# Patient Record
Sex: MALE | URBAN - METROPOLITAN AREA
[De-identification: names, ages, dates, MRNs, and addresses within clinical notes are randomized per-mention and may not be internally consistent; named-entity substitution may affect disease eponyms.]

---

## 2022-04-28 ENCOUNTER — APPOINTMENT (RX ONLY)
Dept: URBAN - METROPOLITAN AREA CLINIC 46 | Facility: CLINIC | Age: 42
Setting detail: DERMATOLOGY
End: 2022-04-28

## 2022-04-28 DIAGNOSIS — L20.89 OTHER ATOPIC DERMATITIS: ICD-10-CM

## 2022-04-28 DIAGNOSIS — L24 IRRITANT CONTACT DERMATITIS: ICD-10-CM

## 2022-04-28 DIAGNOSIS — L85.3 XEROSIS CUTIS: ICD-10-CM | Status: INADEQUATELY CONTROLLED

## 2022-04-28 PROBLEM — L30.9 DERMATITIS, UNSPECIFIED: Status: ACTIVE | Noted: 2022-04-28

## 2022-04-28 PROCEDURE — 99204 OFFICE O/P NEW MOD 45 MIN: CPT

## 2022-04-28 PROCEDURE — ? DEFER

## 2022-04-28 PROCEDURE — ? PRESCRIPTION MEDICATION MANAGEMENT

## 2022-04-28 PROCEDURE — ? COUNSELING

## 2022-04-28 ASSESSMENT — LOCATION ZONE DERM: LOCATION ZONE: HAND

## 2022-04-28 ASSESSMENT — LOCATION DETAILED DESCRIPTION DERM: LOCATION DETAILED: RIGHT RADIAL PALM

## 2022-04-28 ASSESSMENT — LOCATION SIMPLE DESCRIPTION DERM: LOCATION SIMPLE: RIGHT HAND

## 2022-04-28 NOTE — PROCEDURE: DEFER
Detail Level: Zone
Introduction Text (Please End With A Colon): A.) right palm , 3mm , 4.0 closure ;
Procedure To Be Performed At Next Visit: Biopsy by punch method
Introduction Text (Please End With A Colon): B) left medial knee , 3mm , 4.0 closure ;

## 2022-04-28 NOTE — PROCEDURE: MIPS QUALITY
Quality 110: Preventive Care And Screening: Influenza Immunization: Influenza immunization was not ordered or administered, reason not given
Quality 431: Preventive Care And Screening: Unhealthy Alcohol Use - Screening: Patient not identified as an unhealthy alcohol user when screened for unhealthy alcohol use using a systematic screening method
Quality 226: Preventive Care And Screening: Tobacco Use: Screening And Cessation Intervention: Tobacco Screening not Performed for Medical Reasons
Detail Level: Detailed

## 2022-04-28 NOTE — PROCEDURE: PRESCRIPTION MEDICATION MANAGEMENT
Render In Strict Bullet Format?: Yes
Detail Level: Generalized
Plan: Patient educated, recommended to use moisturizer and sunscreen daily.

## 2022-05-25 ENCOUNTER — APPOINTMENT (RX ONLY)
Dept: URBAN - METROPOLITAN AREA CLINIC 46 | Facility: CLINIC | Age: 42
Setting detail: DERMATOLOGY
End: 2022-05-25

## 2022-05-25 DIAGNOSIS — B35.4 TINEA CORPORIS: ICD-10-CM

## 2022-05-25 DIAGNOSIS — L20.89 OTHER ATOPIC DERMATITIS: ICD-10-CM

## 2022-05-25 DIAGNOSIS — D22 MELANOCYTIC NEVI: ICD-10-CM

## 2022-05-25 DIAGNOSIS — D485 NEOPLASM OF UNCERTAIN BEHAVIOR OF SKIN: ICD-10-CM

## 2022-05-25 DIAGNOSIS — L81.4 OTHER MELANIN HYPERPIGMENTATION: ICD-10-CM

## 2022-05-25 PROBLEM — L20.84 INTRINSIC (ALLERGIC) ECZEMA: Status: ACTIVE | Noted: 2022-05-25

## 2022-05-25 PROBLEM — D48.5 NEOPLASM OF UNCERTAIN BEHAVIOR OF SKIN: Status: ACTIVE | Noted: 2022-05-25

## 2022-05-25 PROBLEM — D22.62 MELANOCYTIC NEVI OF LEFT UPPER LIMB, INCLUDING SHOULDER: Status: ACTIVE | Noted: 2022-05-25

## 2022-05-25 PROCEDURE — 99213 OFFICE O/P EST LOW 20 MIN: CPT | Mod: 25

## 2022-05-25 PROCEDURE — ? BIOPSY BY PUNCH METHOD

## 2022-05-25 PROCEDURE — ? COUNSELING

## 2022-05-25 PROCEDURE — 11104 PUNCH BX SKIN SINGLE LESION: CPT

## 2022-05-25 PROCEDURE — ? SUNSCREEN TREATMENT REGIMEN

## 2022-05-25 PROCEDURE — 11105 PUNCH BX SKIN EA SEP/ADDL: CPT

## 2022-05-25 PROCEDURE — ? PRESCRIPTION

## 2022-05-25 PROCEDURE — ? DEFER

## 2022-05-25 RX ORDER — CLOBETASOL PROPIONATE 0.5 MG/G
CREAM TOPICAL
Qty: 60 | Refills: 0 | Status: CANCELLED | COMMUNITY
Start: 2022-05-25

## 2022-05-25 RX ORDER — KETOCONAZOLE 20 MG/G
CREAM TOPICAL BID
Qty: 60 | Refills: 0 | Status: CANCELLED | COMMUNITY
Start: 2022-05-25

## 2022-05-25 RX ORDER — KETOCONAZOLE 20 MG/G
CREAM TOPICAL BID
Qty: 60 | Refills: 0 | Status: ERX

## 2022-05-25 RX ORDER — DESONIDE 0.5 MG/G
CREAM TOPICAL
Qty: 60 | Refills: 0 | Status: CANCELLED | COMMUNITY
Start: 2022-05-25

## 2022-05-25 RX ORDER — DESONIDE 0.5 MG/G
CREAM TOPICAL
Qty: 60 | Refills: 0 | Status: ERX

## 2022-05-25 RX ORDER — CLOBETASOL PROPIONATE 0.5 MG/G
CREAM TOPICAL
Qty: 60 | Refills: 0 | Status: ERX

## 2022-05-25 RX ADMIN — KETOCONAZOLE: 20 CREAM TOPICAL at 00:00

## 2022-05-25 RX ADMIN — CLOBETASOL PROPIONATE: 0.5 CREAM TOPICAL at 00:00

## 2022-05-25 RX ADMIN — DESONIDE: 0.5 CREAM TOPICAL at 00:00

## 2022-05-25 ASSESSMENT — LOCATION DETAILED DESCRIPTION DERM
LOCATION DETAILED: RIGHT BUTTOCK
LOCATION DETAILED: LEFT SUPERIOR FOREHEAD
LOCATION DETAILED: LEFT DISTAL POSTERIOR UPPER ARM
LOCATION DETAILED: LEFT BUTTOCK
LOCATION DETAILED: RIGHT DISTAL POSTERIOR UPPER ARM
LOCATION DETAILED: RIGHT CENTRAL MALAR CHEEK
LOCATION DETAILED: LEFT PROXIMAL DORSAL FOREARM
LOCATION DETAILED: LEFT KNEE
LOCATION DETAILED: INFERIOR LUMBAR SPINE
LOCATION DETAILED: RIGHT RADIAL PALM
LOCATION DETAILED: LEFT CENTRAL MALAR CHEEK

## 2022-05-25 ASSESSMENT — LOCATION ZONE DERM
LOCATION ZONE: FACE
LOCATION ZONE: HAND
LOCATION ZONE: ARM
LOCATION ZONE: LEG
LOCATION ZONE: TRUNK

## 2022-05-25 ASSESSMENT — LOCATION SIMPLE DESCRIPTION DERM
LOCATION SIMPLE: LEFT BUTTOCK
LOCATION SIMPLE: RIGHT UPPER ARM
LOCATION SIMPLE: LOWER BACK
LOCATION SIMPLE: RIGHT HAND
LOCATION SIMPLE: LEFT CHEEK
LOCATION SIMPLE: RIGHT BUTTOCK
LOCATION SIMPLE: LEFT FOREHEAD
LOCATION SIMPLE: LEFT UPPER ARM
LOCATION SIMPLE: LEFT FOREARM
LOCATION SIMPLE: LEFT KNEE
LOCATION SIMPLE: RIGHT CHEEK

## 2022-05-25 NOTE — PROCEDURE: BIOPSY BY PUNCH METHOD
Body Location Override (Optional - Billing Will Still Be Based On Selected Body Map Location If Applicable): right palm
Detail Level: Detailed
Was A Bandage Applied: Yes
Punch Size In Mm: 3
Biopsy Type: PAS
Anesthesia Type: 1% lidocaine with epinephrine
Anesthesia Volume In Cc (Will Not Render If 0): 0.5
Additional Anesthesia Volume In Cc (Will Not Render If 0): 0
Hemostasis: Ligature
Epidermal Sutures: 4-0 Nylon
Wound Care: Bacitracin
Dressing: bandage
Suture Removal: 14 days
Patient Will Remove Sutures At Home?: No
Path Notes (To The Dermatopathologist): Size: 3mm, with PAS stain
Consent: Written consent was obtained and risks were reviewed including but not limited to scarring, infection, bleeding, scabbing, incomplete removal, nerve damage and allergy to anesthesia.
Post-Care Instructions: I reviewed with the patient in detail post-care instructions. Patient is to keep the biopsy site dry overnight, and then apply bacitracin twice daily until healed. Patient may apply hydrogen peroxide soaks to remove any crusting.
Home Suture Removal Text: Patient was provided a home suture removal kit and will remove their sutures at home. If they have any questions or difficulties they will call the office.
Notification Instructions: Patient will be notified of biopsy results. However, patient instructed to call the office if not contacted within 2 weeks.
Billing Type: [employer]
Information: Selecting Yes will display possible errors in your note based on the variables you have selected. This validation is only offered as a suggestion for you. PLEASE NOTE THAT THE VALIDATION TEXT WILL BE REMOVED WHEN YOU FINALIZE YOUR NOTE. IF YOU WANT TO FAX A PRELIMINARY NOTE YOU WILL NEED TO TOGGLE THIS TO 'NO' IF YOU DO NOT WANT IT IN YOUR FAXED NOTE.
Body Location Override (Optional - Billing Will Still Be Based On Selected Body Map Location If Applicable): left medial knee
Home Suture Removal Text: Patient was provided a home suture removal kit and will remove their sutures at home.  If they have any questions or difficulties they will call the office.
Billing Type: Third-Party Bill

## 2022-05-25 NOTE — PROCEDURE: DEFER
Introduction Text (Please End With A Colon): Pending auth codes for treatment: [id number].
Procedure To Be Performed At Next Visit: Biopsy by punch method
Detail Level: Zone

## 2022-06-09 ENCOUNTER — APPOINTMENT (RX ONLY)
Dept: URBAN - METROPOLITAN AREA CLINIC 46 | Facility: CLINIC | Age: 42
Setting detail: DERMATOLOGY
End: 2022-06-09

## 2022-06-09 DIAGNOSIS — L28.0 LICHEN SIMPLEX CHRONICUS: ICD-10-CM

## 2022-06-09 DIAGNOSIS — D22 MELANOCYTIC NEVI: ICD-10-CM

## 2022-06-09 DIAGNOSIS — B35.4 TINEA CORPORIS: ICD-10-CM

## 2022-06-09 DIAGNOSIS — L81.4 OTHER MELANIN HYPERPIGMENTATION: ICD-10-CM

## 2022-06-09 DIAGNOSIS — Z48.02 ENCOUNTER FOR REMOVAL OF SUTURES: ICD-10-CM

## 2022-06-09 DIAGNOSIS — L24 IRRITANT CONTACT DERMATITIS: ICD-10-CM | Status: INADEQUATELY CONTROLLED

## 2022-06-09 PROBLEM — L24.9 IRRITANT CONTACT DERMATITIS, UNSPECIFIED CAUSE: Status: ACTIVE | Noted: 2022-06-09

## 2022-06-09 PROBLEM — D22.62 MELANOCYTIC NEVI OF LEFT UPPER LIMB, INCLUDING SHOULDER: Status: ACTIVE | Noted: 2022-06-09

## 2022-06-09 PROCEDURE — ? SUNSCREEN TREATMENT REGIMEN

## 2022-06-09 PROCEDURE — ? COUNSELING

## 2022-06-09 PROCEDURE — ? PRESCRIPTION MEDICATION MANAGEMENT

## 2022-06-09 PROCEDURE — ? DEFER

## 2022-06-09 PROCEDURE — 99024 POSTOP FOLLOW-UP VISIT: CPT

## 2022-06-09 PROCEDURE — ? SUTURE REMOVAL (GLOBAL PERIOD)

## 2022-06-09 PROCEDURE — 99214 OFFICE O/P EST MOD 30 MIN: CPT

## 2022-06-09 PROCEDURE — ? PATHOLOGY DISCUSSION

## 2022-06-09 ASSESSMENT — LOCATION SIMPLE DESCRIPTION DERM
LOCATION SIMPLE: LEFT FOREHEAD
LOCATION SIMPLE: LEFT THIGH
LOCATION SIMPLE: RIGHT HAND
LOCATION SIMPLE: LOWER BACK
LOCATION SIMPLE: LEFT KNEE
LOCATION SIMPLE: LEFT FOREARM
LOCATION SIMPLE: RIGHT THIGH

## 2022-06-09 ASSESSMENT — LOCATION DETAILED DESCRIPTION DERM
LOCATION DETAILED: RIGHT ULNAR PALM
LOCATION DETAILED: LEFT ANTERIOR PROXIMAL THIGH
LOCATION DETAILED: RIGHT ANTERIOR PROXIMAL THIGH
LOCATION DETAILED: LEFT SUPERIOR FOREHEAD
LOCATION DETAILED: INFERIOR LUMBAR SPINE
LOCATION DETAILED: LEFT PROXIMAL DORSAL FOREARM
LOCATION DETAILED: LEFT MEDIAL KNEE

## 2022-06-09 ASSESSMENT — LOCATION ZONE DERM
LOCATION ZONE: ARM
LOCATION ZONE: TRUNK
LOCATION ZONE: FACE
LOCATION ZONE: HAND
LOCATION ZONE: LEG
LOCATION ZONE: LEG

## 2022-06-09 NOTE — PROCEDURE: PRESCRIPTION MEDICATION MANAGEMENT
Continue Regimen: Clobetasol cream 0.05%.
Render In Strict Bullet Format?: Yes
Detail Level: Generalized
Plan: Patient educated, recommended to see PCP for allergist referral.
Initiate Treatment: Desonide cream 0.05%
Discontinue Regimen: Ketoconazole cream 2%

## 2022-06-09 NOTE — PROCEDURE: DEFER
Introduction Text (Please End With A Colon): Will submit prior authorization for cortisone injections .
Detail Level: Zone
Procedure To Be Performed At Next Visit: Injection

## 2022-06-09 NOTE — PROCEDURE: SUTURE REMOVAL (GLOBAL PERIOD)
Body Location Override (Optional - Billing Will Still Be Based On Selected Body Map Location If Applicable): right palm
Detail Level: Zone
Add 40938 Cpt? (Important Note: In 2017 The Use Of 66135 Is Being Tracked By Cms To Determine Future Global Period Reimbursement For Global Periods): yes
Body Location Override (Optional - Billing Will Still Be Based On Selected Body Map Location If Applicable): left medial knee